# Patient Record
Sex: FEMALE | Race: WHITE | NOT HISPANIC OR LATINO | Employment: OTHER | URBAN - METROPOLITAN AREA
[De-identification: names, ages, dates, MRNs, and addresses within clinical notes are randomized per-mention and may not be internally consistent; named-entity substitution may affect disease eponyms.]

---

## 2023-03-22 ENCOUNTER — TELEPHONE (OUTPATIENT)
Dept: PRIMARY CARE | Facility: CLINIC | Age: 28
End: 2023-03-22
Payer: COMMERCIAL

## 2023-03-22 DIAGNOSIS — H10.30 ACUTE BACTERIAL CONJUNCTIVITIS, UNSPECIFIED LATERALITY: Primary | ICD-10-CM

## 2023-03-22 RX ORDER — CIPROFLOXACIN HYDROCHLORIDE 3 MG/ML
1 SOLUTION/ DROPS OPHTHALMIC
Qty: 6 ML | Refills: 0 | Status: SHIPPED | OUTPATIENT
Start: 2023-03-22 | End: 2023-04-01

## 2023-03-22 NOTE — TELEPHONE ENCOUNTER
Pt mom states pt was sent home from adult day care with pink eye.  Mom asking for medication and for note to return to care.

## 2023-03-29 LAB
ALANINE AMINOTRANSFERASE (SGPT) (U/L) IN SER/PLAS: 11 U/L (ref 7–45)
ALBUMIN (G/DL) IN SER/PLAS: 4.5 G/DL (ref 3.4–5)
ALKALINE PHOSPHATASE (U/L) IN SER/PLAS: 80 U/L (ref 33–110)
ANION GAP IN SER/PLAS: 17 MMOL/L (ref 10–20)
ASPARTATE AMINOTRANSFERASE (SGOT) (U/L) IN SER/PLAS: 15 U/L (ref 9–39)
BASOPHILS (10*3/UL) IN BLOOD BY AUTOMATED COUNT: 0.06 X10E9/L (ref 0–0.1)
BASOPHILS/100 LEUKOCYTES IN BLOOD BY AUTOMATED COUNT: 0.5 % (ref 0–2)
BILIRUBIN TOTAL (MG/DL) IN SER/PLAS: 0.3 MG/DL (ref 0–1.2)
C REACTIVE PROTEIN (MG/L) IN SER/PLAS: 0.4 MG/DL
CALCIUM (MG/DL) IN SER/PLAS: 9.4 MG/DL (ref 8.6–10.3)
CARBON DIOXIDE, TOTAL (MMOL/L) IN SER/PLAS: 21 MMOL/L (ref 21–32)
CHLORIDE (MMOL/L) IN SER/PLAS: 104 MMOL/L (ref 98–107)
CREATININE (MG/DL) IN SER/PLAS: 0.65 MG/DL (ref 0.5–1.05)
EOSINOPHILS (10*3/UL) IN BLOOD BY AUTOMATED COUNT: 0.36 X10E9/L (ref 0–0.7)
EOSINOPHILS/100 LEUKOCYTES IN BLOOD BY AUTOMATED COUNT: 2.9 % (ref 0–6)
ERYTHROCYTE DISTRIBUTION WIDTH (RATIO) BY AUTOMATED COUNT: 13.3 % (ref 11.5–14.5)
ERYTHROCYTE MEAN CORPUSCULAR HEMOGLOBIN CONCENTRATION (G/DL) BY AUTOMATED: 33 G/DL (ref 32–36)
ERYTHROCYTE MEAN CORPUSCULAR VOLUME (FL) BY AUTOMATED COUNT: 93 FL (ref 80–100)
ERYTHROCYTES (10*6/UL) IN BLOOD BY AUTOMATED COUNT: 4.99 X10E12/L (ref 4–5.2)
GAMMA GLUTAMYL TRANSFERASE (U/L) IN SER/PLAS: 11 U/L (ref 5–55)
GFR FEMALE: >90 ML/MIN/1.73M2
GLUCOSE (MG/DL) IN SER/PLAS: 86 MG/DL (ref 74–99)
HEMATOCRIT (%) IN BLOOD BY AUTOMATED COUNT: 46.3 % (ref 36–46)
HEMOGLOBIN (G/DL) IN BLOOD: 15.3 G/DL (ref 12–16)
IMMATURE GRANULOCYTES/100 LEUKOCYTES IN BLOOD BY AUTOMATED COUNT: 0.2 % (ref 0–0.9)
LEUKOCYTES (10*3/UL) IN BLOOD BY AUTOMATED COUNT: 12.6 X10E9/L (ref 4.4–11.3)
LYMPHOCYTES (10*3/UL) IN BLOOD BY AUTOMATED COUNT: 4.56 X10E9/L (ref 1.2–4.8)
LYMPHOCYTES/100 LEUKOCYTES IN BLOOD BY AUTOMATED COUNT: 36.1 % (ref 13–44)
MONOCYTES (10*3/UL) IN BLOOD BY AUTOMATED COUNT: 0.86 X10E9/L (ref 0.1–1)
MONOCYTES/100 LEUKOCYTES IN BLOOD BY AUTOMATED COUNT: 6.8 % (ref 2–10)
NEUTROPHILS (10*3/UL) IN BLOOD BY AUTOMATED COUNT: 6.76 X10E9/L (ref 1.2–7.7)
NEUTROPHILS/100 LEUKOCYTES IN BLOOD BY AUTOMATED COUNT: 53.5 % (ref 40–80)
NRBC (PER 100 WBCS) BY AUTOMATED COUNT: 0 /100 WBC (ref 0–0)
PLATELETS (10*3/UL) IN BLOOD AUTOMATED COUNT: 290 X10E9/L (ref 150–450)
POTASSIUM (MMOL/L) IN SER/PLAS: 4.1 MMOL/L (ref 3.5–5.3)
PROTEIN TOTAL: 7.9 G/DL (ref 6.4–8.2)
SEDIMENTATION RATE, ERYTHROCYTE: 30 MM/H (ref 0–20)
SODIUM (MMOL/L) IN SER/PLAS: 138 MMOL/L (ref 136–145)
UREA NITROGEN (MG/DL) IN SER/PLAS: 7 MG/DL (ref 6–23)

## 2023-03-30 LAB — CALCIDIOL (25 OH VITAMIN D3) (NG/ML) IN SER/PLAS: 31 NG/ML

## 2023-04-01 LAB
NIL(NEG) CONTROL SPOT COUNT: NORMAL
PANEL A SPOT COUNT: 0
PANEL B SPOT COUNT: 1
POS CONTROL SPOT COUNT: NORMAL
T-SPOT. TB INTERPRETATION: NEGATIVE

## 2023-04-11 LAB
AB TO ADALIMUMAB(ATA) CONC.: <1.7 U/ML
ADA RESULTS: ABNORMAL
ADALIMUMAB(ADA) CONC.: 14.2 UG/ML

## 2023-06-21 ENCOUNTER — OFFICE VISIT (OUTPATIENT)
Dept: PRIMARY CARE | Facility: CLINIC | Age: 28
End: 2023-06-21
Payer: COMMERCIAL

## 2023-06-21 VITALS — SYSTOLIC BLOOD PRESSURE: 122 MMHG | HEIGHT: 60 IN | BODY MASS INDEX: 31.31 KG/M2 | DIASTOLIC BLOOD PRESSURE: 74 MMHG

## 2023-06-21 DIAGNOSIS — K42.9 UMBILICAL HERNIA WITHOUT OBSTRUCTION AND WITHOUT GANGRENE: Primary | ICD-10-CM

## 2023-06-21 PROBLEM — R56.9 SEIZURES (MULTI): Status: ACTIVE | Noted: 2023-06-21

## 2023-06-21 PROBLEM — F70 MILD INTELLECTUAL DISABILITY: Status: ACTIVE | Noted: 2023-06-21

## 2023-06-21 PROBLEM — K59.00 CONSTIPATION: Status: ACTIVE | Noted: 2023-06-21

## 2023-06-21 PROBLEM — R32 URINARY INCONTINENCE: Status: ACTIVE | Noted: 2023-06-21

## 2023-06-21 PROBLEM — R23.8 SKIN BREAKDOWN: Status: ACTIVE | Noted: 2023-06-21

## 2023-06-21 PROBLEM — J06.9 UPPER RESPIRATORY TRACT INFECTION: Status: ACTIVE | Noted: 2023-06-21

## 2023-06-21 PROBLEM — E87.5 HYPERKALEMIA: Status: ACTIVE | Noted: 2023-06-21

## 2023-06-21 PROBLEM — G80.9 CEREBRAL PALSY (MULTI): Status: ACTIVE | Noted: 2023-06-21

## 2023-06-21 PROBLEM — Z79.620 ADALIMUMAB (HUMIRA) LONG-TERM USE: Status: ACTIVE | Noted: 2023-06-21

## 2023-06-21 PROBLEM — R29.4 HIP CLICK: Status: ACTIVE | Noted: 2023-06-21

## 2023-06-21 PROBLEM — R93.5 ABNORMAL CT OF THE ABDOMEN: Status: ACTIVE | Noted: 2023-06-21

## 2023-06-21 PROBLEM — K50.90 CROHN'S DISEASE (MULTI): Status: ACTIVE | Noted: 2020-07-29

## 2023-06-21 PROBLEM — K21.9 ESOPHAGEAL REFLUX: Status: ACTIVE | Noted: 2023-06-21

## 2023-06-21 PROBLEM — G80.0 CP (CEREBRAL PALSY), SPASTIC, QUADRIPLEGIC (MULTI): Status: ACTIVE | Noted: 2023-06-21

## 2023-06-21 PROBLEM — G24.9 GENERALIZED DYSTONIA: Status: ACTIVE | Noted: 2023-06-21

## 2023-06-21 PROBLEM — F43.22 ADJUSTMENT DISORDER WITH ANXIETY: Status: ACTIVE | Noted: 2018-02-14

## 2023-06-21 PROBLEM — M21.70 LEG LENGTH DISCREPANCY: Status: ACTIVE | Noted: 2023-06-21

## 2023-06-21 PROBLEM — D50.9 ANEMIA, IRON DEFICIENCY: Status: ACTIVE | Noted: 2020-12-23

## 2023-06-21 PROBLEM — H10.30 ACUTE CONJUNCTIVITIS: Status: ACTIVE | Noted: 2023-06-21

## 2023-06-21 PROBLEM — R21 RASH: Status: ACTIVE | Noted: 2023-06-21

## 2023-06-21 PROBLEM — G82.50 SPASTIC QUADRIPARESIS (MULTI): Status: ACTIVE | Noted: 2023-06-21

## 2023-06-21 PROBLEM — K56.699: Status: ACTIVE | Noted: 2023-06-21

## 2023-06-21 PROBLEM — M25.559 JOINT PAIN, HIP: Status: ACTIVE | Noted: 2023-06-21

## 2023-06-21 PROBLEM — D72.829 ELEVATED WHITE BLOOD CELL COUNT: Status: ACTIVE | Noted: 2023-06-21

## 2023-06-21 PROBLEM — R25.2 SPASTICITY: Status: ACTIVE | Noted: 2023-06-21

## 2023-06-21 PROBLEM — G40.909 SEIZURE DISORDER (MULTI): Status: ACTIVE | Noted: 2018-11-21

## 2023-06-21 PROCEDURE — 1036F TOBACCO NON-USER: CPT | Performed by: STUDENT IN AN ORGANIZED HEALTH CARE EDUCATION/TRAINING PROGRAM

## 2023-06-21 PROCEDURE — 99213 OFFICE O/P EST LOW 20 MIN: CPT | Performed by: STUDENT IN AN ORGANIZED HEALTH CARE EDUCATION/TRAINING PROGRAM

## 2023-06-21 RX ORDER — BENZOYL PEROXIDE 100 MG/ML
LIQUID TOPICAL
COMMUNITY

## 2023-06-21 RX ORDER — CLOBETASOL PROPIONATE 0.5 MG/G
CREAM TOPICAL
COMMUNITY
Start: 2022-04-26

## 2023-06-21 RX ORDER — ONDANSETRON 8 MG/1
TABLET, ORALLY DISINTEGRATING ORAL
COMMUNITY
End: 2023-06-21

## 2023-06-21 RX ORDER — OXYCODONE HYDROCHLORIDE 5 MG/1
TABLET ORAL
COMMUNITY
End: 2024-02-06 | Stop reason: ALTCHOICE

## 2023-06-21 RX ORDER — HYDROCORTISONE 25 MG/G
CREAM TOPICAL
COMMUNITY
Start: 2021-12-02

## 2023-06-21 RX ORDER — MINERAL OIL
ENEMA (ML) RECTAL
COMMUNITY
Start: 2022-02-09

## 2023-06-21 RX ORDER — CLOBETASOL PROPIONATE 0.46 MG/ML
SOLUTION TOPICAL
COMMUNITY
Start: 2021-10-07

## 2023-06-21 RX ORDER — LIDOCAINE 40 MG/G
CREAM TOPICAL
COMMUNITY
Start: 2019-12-11

## 2023-06-21 RX ORDER — PREDNISONE 20 MG/1
TABLET ORAL
COMMUNITY
End: 2024-02-06 | Stop reason: ALTCHOICE

## 2023-06-21 RX ORDER — DICYCLOMINE HYDROCHLORIDE 10 MG/5ML
SOLUTION ORAL
COMMUNITY
End: 2024-02-06 | Stop reason: ALTCHOICE

## 2023-06-21 RX ORDER — KETOCONAZOLE 20 MG/ML
SHAMPOO, SUSPENSION TOPICAL
COMMUNITY

## 2023-06-21 RX ORDER — LACTULOSE 10 G/15ML
SOLUTION ORAL; RECTAL
COMMUNITY
End: 2024-02-06 | Stop reason: ALTCHOICE

## 2023-06-21 RX ORDER — CLINDAMYCIN PHOSPHATE 11.9 MG/ML
SOLUTION TOPICAL
COMMUNITY
Start: 2022-06-14 | End: 2023-11-29 | Stop reason: WASHOUT

## 2023-06-21 RX ORDER — TERBINAFINE HYDROCHLORIDE 250 MG/1
1 TABLET ORAL DAILY
COMMUNITY
Start: 2021-12-27 | End: 2024-02-06 | Stop reason: ALTCHOICE

## 2023-06-21 RX ORDER — CLONAZEPAM 2 MG/1
TABLET, ORALLY DISINTEGRATING ORAL
COMMUNITY
End: 2024-02-06 | Stop reason: ALTCHOICE

## 2023-06-21 RX ORDER — ADALIMUMAB 40MG/0.4ML
KIT SUBCUTANEOUS
COMMUNITY
Start: 2019-12-10 | End: 2023-11-07 | Stop reason: SDUPTHER

## 2023-06-21 RX ORDER — CLINDAMYCIN PHOSPHATE 10 UG/ML
LOTION TOPICAL
COMMUNITY
End: 2023-11-29 | Stop reason: WASHOUT

## 2023-06-21 RX ORDER — ENOXAPARIN SODIUM 100 MG/ML
INJECTION SUBCUTANEOUS
COMMUNITY
End: 2024-02-06 | Stop reason: ALTCHOICE

## 2023-06-21 RX ORDER — MINOCYCLINE HYDROCHLORIDE 50 MG/1
TABLET ORAL
COMMUNITY
Start: 2023-06-11 | End: 2024-02-06 | Stop reason: ALTCHOICE

## 2023-06-21 RX ORDER — NEOMYCIN SULFATE 500 MG/1
TABLET ORAL
COMMUNITY
End: 2023-11-29 | Stop reason: WASHOUT

## 2023-06-21 RX ORDER — DOXYCYCLINE 50 MG/1
CAPSULE ORAL
COMMUNITY
Start: 2023-02-28 | End: 2024-02-06 | Stop reason: ALTCHOICE

## 2023-06-21 RX ORDER — ONDANSETRON 4 MG/1
TABLET, ORALLY DISINTEGRATING ORAL
COMMUNITY
Start: 2022-05-04 | End: 2024-02-06 | Stop reason: ALTCHOICE

## 2023-06-21 RX ORDER — OMEPRAZOLE 40 MG/1
CAPSULE, DELAYED RELEASE ORAL
COMMUNITY
End: 2024-02-06 | Stop reason: ALTCHOICE

## 2023-06-21 RX ORDER — KETOCONAZOLE 20 MG/G
CREAM TOPICAL
COMMUNITY

## 2023-06-21 RX ORDER — PIMECROLIMUS 10 MG/G
CREAM TOPICAL
COMMUNITY

## 2023-06-21 RX ORDER — CICLOPIROX OLAMINE 7.7 MG/G
CREAM TOPICAL
COMMUNITY
Start: 2021-12-27

## 2023-06-21 RX ORDER — TRIAMCINOLONE ACETONIDE 1 MG/G
CREAM TOPICAL
COMMUNITY

## 2023-06-21 ASSESSMENT — PAIN SCALES - GENERAL: PAINLEVEL: 0-NO PAIN

## 2023-06-21 NOTE — PATIENT INSTRUCTIONS
1.  Umbilicus hernia.  He is reducible today nontender.  Discussed sitting down with a general surgeon to discuss an elective repair to prevent a future problem.  Referral given today

## 2023-06-21 NOTE — PROGRESS NOTES
Subjective   Patient ID: Chloe Teran is a 27 y.o. female who presents for Hearing Problem (Hernia on belly buttom for 1 yr; growing concerns. ).    HPI comes in with her father and one of her caretakers.  Umbilical hernia they have noticed is slowly gotten bigger.  It does not cause her significant pain.    Review of Systems  Constitutional: NO F, chills, or sweats  Eyes: no blurred vision or visual disturbance  ENT: no hearing loss, no congestion, no nasal discharge, no hoarseness and no sore throat.   Cardiovascular: no chest pain, no edema, no palps and no syncope.   Respiratory: no cough,no s.o.b. and no wheezing  Gastrointestinal: Concern for umbilical hernia  Objective   /74 (BP Location: Left arm, Patient Position: Sitting, BP Cuff Size: Adult)   Ht 1.524 m (5')   BMI 31.31 kg/m²     Physical Exam  gen- a & o x 3, nad, pleasant    ab- soft, nontender, umbilical hernia is soft and reducible  Assessment/Plan     1.  Umbilicus hernia.  He is reducible today nontender.  Discussed sitting down with a general surgeon to discuss an elective repair to prevent a future problem.  Referral given today

## 2023-08-15 ENCOUNTER — TELEPHONE (OUTPATIENT)
Dept: PRIMARY CARE | Facility: CLINIC | Age: 28
End: 2023-08-15
Payer: COMMERCIAL

## 2023-08-15 NOTE — TELEPHONE ENCOUNTER
Randi Middleton L - [id number]     Born 11/18/1971   51 y.o. Female       [address]   [facility] [id number]              , mother states nobody else needs to be added to letter and thank you

## 2023-08-15 NOTE — TELEPHONE ENCOUNTER
Patient mother called asking if you can write a letter stating she is the legal guardian of the patient. They are requesting this for the court . Patient spouse will  once done   [facility] court system

## 2023-08-22 NOTE — TELEPHONE ENCOUNTER
Pt mom called again asking about letter.     I put together the letter and sent it back you pended so you can adjust/change whatever is needed.

## 2023-08-23 NOTE — PROGRESS NOTES
[facility],    Chloe Teran is a patient in our office.  Due to her chronic lifelong health issues she is completely disabled and dependent on others.    Her permanent guardian and power of  is     Randi Middleton 11/18/1971    [address]   [facility] [address]              Thank you for your cooperation.    Juan Ayala DO

## 2023-09-19 LAB
ALANINE AMINOTRANSFERASE (SGPT) (U/L) IN SER/PLAS: 10 U/L (ref 7–45)
ALBUMIN (G/DL) IN SER/PLAS: 4.8 G/DL (ref 3.4–5)
ALKALINE PHOSPHATASE (U/L) IN SER/PLAS: 72 U/L (ref 33–110)
ANION GAP IN SER/PLAS: 14 MMOL/L (ref 10–20)
ASPARTATE AMINOTRANSFERASE (SGOT) (U/L) IN SER/PLAS: 14 U/L (ref 9–39)
BASOPHILS (10*3/UL) IN BLOOD BY AUTOMATED COUNT: 0.05 X10E9/L (ref 0–0.1)
BASOPHILS/100 LEUKOCYTES IN BLOOD BY AUTOMATED COUNT: 0.4 % (ref 0–2)
BILIRUBIN TOTAL (MG/DL) IN SER/PLAS: 0.4 MG/DL (ref 0–1.2)
C REACTIVE PROTEIN (MG/L) IN SER/PLAS: 0.12 MG/DL
CALCIUM (MG/DL) IN SER/PLAS: 9.7 MG/DL (ref 8.6–10.3)
CARBON DIOXIDE, TOTAL (MMOL/L) IN SER/PLAS: 23 MMOL/L (ref 21–32)
CHLORIDE (MMOL/L) IN SER/PLAS: 105 MMOL/L (ref 98–107)
CREATININE (MG/DL) IN SER/PLAS: 0.58 MG/DL (ref 0.5–1.05)
EOSINOPHILS (10*3/UL) IN BLOOD BY AUTOMATED COUNT: 0.09 X10E9/L (ref 0–0.7)
EOSINOPHILS/100 LEUKOCYTES IN BLOOD BY AUTOMATED COUNT: 0.8 % (ref 0–6)
ERYTHROCYTE DISTRIBUTION WIDTH (RATIO) BY AUTOMATED COUNT: 12.8 % (ref 11.5–14.5)
ERYTHROCYTE MEAN CORPUSCULAR HEMOGLOBIN CONCENTRATION (G/DL) BY AUTOMATED: 31.5 G/DL (ref 32–36)
ERYTHROCYTE MEAN CORPUSCULAR VOLUME (FL) BY AUTOMATED COUNT: 96 FL (ref 80–100)
ERYTHROCYTES (10*6/UL) IN BLOOD BY AUTOMATED COUNT: 5.07 X10E12/L (ref 4–5.2)
GFR FEMALE: >90 ML/MIN/1.73M2
GLUCOSE (MG/DL) IN SER/PLAS: 99 MG/DL (ref 74–99)
HEMATOCRIT (%) IN BLOOD BY AUTOMATED COUNT: 48.9 % (ref 36–46)
HEMOGLOBIN (G/DL) IN BLOOD: 15.4 G/DL (ref 12–16)
IMMATURE GRANULOCYTES/100 LEUKOCYTES IN BLOOD BY AUTOMATED COUNT: 0.3 % (ref 0–0.9)
LEUKOCYTES (10*3/UL) IN BLOOD BY AUTOMATED COUNT: 11.7 X10E9/L (ref 4.4–11.3)
LYMPHOCYTES (10*3/UL) IN BLOOD BY AUTOMATED COUNT: 3.24 X10E9/L (ref 1.2–4.8)
LYMPHOCYTES/100 LEUKOCYTES IN BLOOD BY AUTOMATED COUNT: 27.6 % (ref 13–44)
MONOCYTES (10*3/UL) IN BLOOD BY AUTOMATED COUNT: 0.53 X10E9/L (ref 0.1–1)
MONOCYTES/100 LEUKOCYTES IN BLOOD BY AUTOMATED COUNT: 4.5 % (ref 2–10)
NEUTROPHILS (10*3/UL) IN BLOOD BY AUTOMATED COUNT: 7.79 X10E9/L (ref 1.2–7.7)
NEUTROPHILS/100 LEUKOCYTES IN BLOOD BY AUTOMATED COUNT: 66.4 % (ref 40–80)
NRBC (PER 100 WBCS) BY AUTOMATED COUNT: 0 /100 WBC (ref 0–0)
PLATELETS (10*3/UL) IN BLOOD AUTOMATED COUNT: 246 X10E9/L (ref 150–450)
POTASSIUM (MMOL/L) IN SER/PLAS: 3.7 MMOL/L (ref 3.5–5.3)
PROTEIN TOTAL: 8.3 G/DL (ref 6.4–8.2)
SEDIMENTATION RATE, ERYTHROCYTE: 15 MM/H (ref 0–20)
SODIUM (MMOL/L) IN SER/PLAS: 138 MMOL/L (ref 136–145)
UREA NITROGEN (MG/DL) IN SER/PLAS: 6 MG/DL (ref 6–23)

## 2023-09-27 LAB — CALPROTECTIN, STOOL: 80 UG/G

## 2023-10-06 ENCOUNTER — TELEPHONE (OUTPATIENT)
Dept: PRIMARY CARE | Facility: CLINIC | Age: 28
End: 2023-10-06
Payer: COMMERCIAL

## 2023-10-06 DIAGNOSIS — H10.30 ACUTE CONJUNCTIVITIS, UNSPECIFIED ACUTE CONJUNCTIVITIS TYPE, UNSPECIFIED LATERALITY: Primary | ICD-10-CM

## 2023-10-06 RX ORDER — POLYMYXIN B SULFATE AND TRIMETHOPRIM 1; 10000 MG/ML; [USP'U]/ML
1 SOLUTION OPHTHALMIC
Qty: 10 ML | Refills: 0 | Status: SHIPPED | OUTPATIENT
Start: 2023-10-06 | End: 2023-10-13

## 2023-10-06 NOTE — TELEPHONE ENCOUNTER
Pt mother called and states that the pt has pink eye and states that you prescribed her eye drops once before and would like to know if you could prescribe her the eye drops again. Pharmacy is on file.

## 2023-10-17 ENCOUNTER — TELEPHONE (OUTPATIENT)
Dept: GASTROENTEROLOGY | Facility: CLINIC | Age: 28
End: 2023-10-17
Payer: COMMERCIAL

## 2023-11-07 DIAGNOSIS — K50.919 CROHN'S DISEASE WITH COMPLICATION, UNSPECIFIED GASTROINTESTINAL TRACT LOCATION (MULTI): Primary | ICD-10-CM

## 2023-11-07 RX ORDER — ADALIMUMAB 40MG/0.4ML
40 KIT SUBCUTANEOUS
Qty: 2 EACH | Refills: 11 | Status: SHIPPED | OUTPATIENT
Start: 2023-11-07 | End: 2023-11-29 | Stop reason: SDUPTHER

## 2023-11-28 ENCOUNTER — TELEPHONE (OUTPATIENT)
Dept: GASTROENTEROLOGY | Facility: CLINIC | Age: 28
End: 2023-11-28
Payer: COMMERCIAL

## 2023-11-28 NOTE — TELEPHONE ENCOUNTER
Pt.'s daughter called and the only way that her Humira CF Pen 40 mg will be paid for from insurance if it is filled at [facility] pharmacy.  I added [facility] to the pharmacy list.  Please in script at [facility].

## 2023-11-29 DIAGNOSIS — K50.919 CROHN'S DISEASE WITH COMPLICATION, UNSPECIFIED GASTROINTESTINAL TRACT LOCATION (MULTI): ICD-10-CM

## 2023-11-29 RX ORDER — ADALIMUMAB 40MG/0.4ML
40 KIT SUBCUTANEOUS
Qty: 2 EACH | Refills: 11 | Status: SHIPPED | OUTPATIENT
Start: 2023-11-29 | End: 2024-11-28

## 2023-12-01 ENCOUNTER — TELEPHONE (OUTPATIENT)
Dept: PRIMARY CARE | Facility: CLINIC | Age: 28
End: 2023-12-01
Payer: COMMERCIAL

## 2023-12-01 NOTE — TELEPHONE ENCOUNTER
Amari, the patient's grandfather called stating he dropped off some paperwork for braces a couple of weeks ago. Wants to know the status. I did not find it in your box in [place] where he originally dropped it off.

## 2023-12-04 DIAGNOSIS — G82.50 SPASTIC QUADRIPARESIS (MULTI): ICD-10-CM

## 2023-12-04 DIAGNOSIS — G80.0 CP (CEREBRAL PALSY), SPASTIC, QUADRIPLEGIC (MULTI): Primary | ICD-10-CM

## 2023-12-04 DIAGNOSIS — M21.371 FOOT DROP, BILATERAL: ICD-10-CM

## 2023-12-04 DIAGNOSIS — M21.372 FOOT DROP, BILATERAL: ICD-10-CM

## 2023-12-20 ENCOUNTER — TELEPHONE (OUTPATIENT)
Dept: PRIMARY CARE | Facility: CLINIC | Age: 28
End: 2023-12-20
Payer: COMMERCIAL

## 2023-12-20 NOTE — TELEPHONE ENCOUNTER
Chloe is in a program and they thought she had pink eye but Mirian states she doesn't have pink eye, she just had a cut/scratch on her eye.   In order to return they need a note stating she does not have pink eye and can return today mirian stated they will be going to the [place] office today

## 2024-01-15 ENCOUNTER — APPOINTMENT (OUTPATIENT)
Dept: PRIMARY CARE | Facility: CLINIC | Age: 29
End: 2024-01-15
Payer: COMMERCIAL

## 2024-02-06 ENCOUNTER — OFFICE VISIT (OUTPATIENT)
Dept: PRIMARY CARE | Facility: CLINIC | Age: 29
End: 2024-02-06
Payer: COMMERCIAL

## 2024-02-06 VITALS — DIASTOLIC BLOOD PRESSURE: 78 MMHG | SYSTOLIC BLOOD PRESSURE: 118 MMHG

## 2024-02-06 DIAGNOSIS — G80.0 CP (CEREBRAL PALSY), SPASTIC, QUADRIPLEGIC (MULTI): Primary | ICD-10-CM

## 2024-02-06 DIAGNOSIS — M21.70 LEG LENGTH DISCREPANCY: ICD-10-CM

## 2024-02-06 DIAGNOSIS — G82.50 SPASTIC QUADRIPLEGIA (MULTI): ICD-10-CM

## 2024-02-06 PROBLEM — S09.90XA CLOSED HEAD INJURY: Status: ACTIVE | Noted: 2024-02-06
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
## 2024-02-06 PROBLEM — L90.9 ATROPHY OF SKIN: Status: ACTIVE | Noted: 2024-02-06

## 2024-02-06 PROBLEM — K52.9 ILEITIS: Status: ACTIVE | Noted: 2024-02-06

## 2024-02-06 PROBLEM — B35.6 TINEA CRURIS: Status: ACTIVE | Noted: 2021-12-27

## 2024-02-06 PROBLEM — L21.8 OTHER SEBORRHEIC DERMATITIS: Status: ACTIVE | Noted: 2021-09-21

## 2024-02-06 PROBLEM — K42.9 UMBILICAL HERNIA: Status: ACTIVE | Noted: 2024-02-06

## 2024-02-06 PROBLEM — L30.4 ERYTHEMA INTERTRIGO: Status: ACTIVE | Noted: 2021-09-21

## 2024-02-06 PROBLEM — L73.8 OTHER SPECIFIED FOLLICULAR DISORDERS: Status: ACTIVE | Noted: 2021-07-14

## 2024-02-06 PROBLEM — L40.0 PSORIASIS VULGARIS: Status: ACTIVE | Noted: 2021-07-14

## 2024-02-06 PROBLEM — R70.0 ELEVATED ERYTHROCYTE SEDIMENTATION RATE: Status: ACTIVE | Noted: 2024-02-06

## 2024-02-06 PROBLEM — R79.82 HIGH C-REACTIVE PROTEIN: Status: ACTIVE | Noted: 2024-02-06

## 2024-02-06 PROBLEM — R11.2 NAUSEA AND VOMITING: Status: ACTIVE | Noted: 2024-02-06

## 2024-02-06 PROBLEM — R21 RASH AND OTHER NONSPECIFIC SKIN ERUPTION: Status: ACTIVE | Noted: 2021-10-07

## 2024-02-06 PROCEDURE — 1036F TOBACCO NON-USER: CPT | Performed by: STUDENT IN AN ORGANIZED HEALTH CARE EDUCATION/TRAINING PROGRAM

## 2024-02-06 PROCEDURE — 99213 OFFICE O/P EST LOW 20 MIN: CPT | Performed by: STUDENT IN AN ORGANIZED HEALTH CARE EDUCATION/TRAINING PROGRAM

## 2024-02-06 RX ORDER — CLINDAMYCIN PHOSPHATE 11.9 MG/ML
SOLUTION TOPICAL
COMMUNITY

## 2024-02-06 ASSESSMENT — ENCOUNTER SYMPTOMS: DEPRESSION: 0

## 2024-02-06 NOTE — PROGRESS NOTES
Subjective   Patient ID: Chloe Teran is a 28 y.o. female who presents for Follow-up (Needs new leg braces.).    HPI comes in with her father.  Needs orders for new AFO braces bilaterally and left-sided shoe lift.    Review of Systems  Constitutional: NO F, chills, or sweats  Eyes: no blurred vision or visual disturbance  ENT: no hearing loss, no congestion, no nasal discharge, no hoarseness and no sore throat.   Cardiovascular: no chest pain, no edema, no palps and no syncope.   Respiratory: no cough,no s.o.b. and no wheezing  Gastrointestinal: no abdominal pain, No C/D no N/V, no blood in stools  Genitourinary: no dysuria, no change in urinary frequency, no urinary hesitancy and no feelings of urinary urgency.   Musculoskeletal: no arthralgias,  no back pain and no myalgias.   Integumentary: no new skin lesions and no rashes.   Neurological: Spastic quadriplegia    Objective   /78 (BP Location: Left arm, Patient Position: Sitting, BP Cuff Size: Large adult)     Physical Exam  gen- a & o x 3, nad, pleasant  heent- eomi, perrla, ear canals patent, TM's non-erythematous, no fluid, frontal and maxillary sinus's nontender  neck- supple, nontender, no palpable or enlarged nodes, no thyromegaly  heart- rrr,   lungs- cta b/l , no w/r/r  chest- symmetric, nontender  ab- soft, nontender, no palpable organomegaly, postive bowel sounds  ex's- no c/c/e  neuro- CNs 2-12 grossly intact, bilateral upper and lower extremity spasticity, quadriplegia  Assessment/Plan     #1.  History of cerebral palsy, spastic quadriplegia.  Today comes in for removal of bilateral AFO braces.  Order placed today.  Also needs a left lower extremity shoe lift due to leg length discrepancy.  Order placed today.

## 2024-02-29 ENCOUNTER — TELEPHONE (OUTPATIENT)
Dept: GASTROENTEROLOGY | Facility: CLINIC | Age: 29
End: 2024-02-29
Payer: COMMERCIAL

## 2024-02-29 NOTE — TELEPHONE ENCOUNTER
I called the number listed, it was her mom, and she gave me I think grandpas number, so I called that and had to leave a message, stating provider no longer works on Tuesdays, and that the apt for 4/2 was going to be canceled, and that we could see her next wed or Thursday. To call our office

## 2024-03-06 ENCOUNTER — APPOINTMENT (OUTPATIENT)
Dept: GASTROENTEROLOGY | Facility: CLINIC | Age: 29
End: 2024-03-06
Payer: COMMERCIAL

## 2024-04-02 ENCOUNTER — APPOINTMENT (OUTPATIENT)
Dept: GASTROENTEROLOGY | Facility: CLINIC | Age: 29
End: 2024-04-02
Payer: COMMERCIAL

## 2024-10-03 ENCOUNTER — APPOINTMENT (OUTPATIENT)
Dept: PRIMARY CARE | Facility: CLINIC | Age: 29
End: 2024-10-03
Payer: COMMERCIAL

## 2024-10-08 ENCOUNTER — APPOINTMENT (OUTPATIENT)
Dept: PRIMARY CARE | Facility: CLINIC | Age: 29
End: 2024-10-08
Payer: COMMERCIAL

## 2024-10-10 ENCOUNTER — APPOINTMENT (OUTPATIENT)
Dept: PRIMARY CARE | Facility: CLINIC | Age: 29
End: 2024-10-10
Payer: COMMERCIAL

## 2024-10-10 VITALS
WEIGHT: 130 LBS | BODY MASS INDEX: 25.39 KG/M2 | HEART RATE: 86 BPM | OXYGEN SATURATION: 96 % | SYSTOLIC BLOOD PRESSURE: 132 MMHG | TEMPERATURE: 97.7 F | DIASTOLIC BLOOD PRESSURE: 84 MMHG

## 2024-10-10 DIAGNOSIS — H10.33 ACUTE BACTERIAL CONJUNCTIVITIS OF BOTH EYES: ICD-10-CM

## 2024-10-10 DIAGNOSIS — Z00.00 ENCOUNTER FOR ANNUAL PHYSICAL EXAM: Primary | ICD-10-CM

## 2024-10-10 PROCEDURE — 99395 PREV VISIT EST AGE 18-39: CPT | Performed by: NURSE PRACTITIONER

## 2024-10-10 PROCEDURE — 1036F TOBACCO NON-USER: CPT | Performed by: NURSE PRACTITIONER

## 2024-10-10 RX ORDER — TOBRAMYCIN AND DEXAMETHASONE 3; 1 MG/ML; MG/ML
1 SUSPENSION/ DROPS OPHTHALMIC
Qty: 10 ML | Refills: 0 | Status: SHIPPED | OUTPATIENT
Start: 2024-10-10 | End: 2024-10-20

## 2024-10-10 ASSESSMENT — ENCOUNTER SYMPTOMS
CONFUSION: 0
RESPIRATORY NEGATIVE: 1
EYES NEGATIVE: 1
FACIAL ASYMMETRY: 0
WEAKNESS: 0
DIZZINESS: 0
POLYPHAGIA: 0
ENDOCRINE NEGATIVE: 1
NECK STIFFNESS: 1
SLEEP DISTURBANCE: 0
WOUND: 0
NERVOUS/ANXIOUS: 0
NEUROLOGICAL NEGATIVE: 1
GASTROINTESTINAL NEGATIVE: 1
ALLERGIC/IMMUNOLOGIC NEGATIVE: 1
CONSTITUTIONAL NEGATIVE: 1
CARDIOVASCULAR NEGATIVE: 1
LIGHT-HEADEDNESS: 0
HEMATOLOGIC/LYMPHATIC NEGATIVE: 1

## 2024-10-10 ASSESSMENT — PATIENT HEALTH QUESTIONNAIRE - PHQ9
SUM OF ALL RESPONSES TO PHQ9 QUESTIONS 1 AND 2: 0
2. FEELING DOWN, DEPRESSED OR HOPELESS: NOT AT ALL
1. LITTLE INTEREST OR PLEASURE IN DOING THINGS: NOT AT ALL

## 2024-10-10 NOTE — PROGRESS NOTES
Subjective   Patient ID: Chloe Teran is a 28 y.o. female who presents for Establish Care.    HPI Chloe is a 28-year-old female here to establish accompanied by grandfather and caregiver.    History of cerebral palsy, spastic quadriplegia.Crohn disease   History of stricturing small bowel Crohn's disease status post ileocecectomy in 2020. She has been maintained on Humira monotherapy with the last level at 14 adequate.   Follows with the GI team.  Concerns for possible pinkeye?  Does noted with drainage crusting of eyelid that has been noticed for the past several days by her caregiver that is present.  Chloe herself has no concerns she is eating and drinking okay moving bowels and bladder okay.    Active Problems  Problems    · Abnormal CT of the abdomen (793.6) (R93.5)   · Acute conjunctivitis (372.00) (H10.30)   · Adalimumab (Humira) long-term use (V58.69) (Z79.620)   · Anemia, iron deficiency (280.9) (D50.9)   · Constipation (564.00) (K59.00)   · CP (cerebral palsy), spastic, quadriplegic (343.2) (G80.0)   · Crohn disease (555.9) (K50.90)   · Elevated erythrocyte sedimentation rate (790.1) (R70.0)   · Elevated white blood cell count (288.60) (D72.829)   · Esophageal reflux (530.81) (K21.9)   · Hip click (719.65) (R29.4)   · Hyperkalemia (276.7) (E87.5)   · Joint pain, hip (719.45) (M25.559)   · Added by Problem List Migration; 2013-7-21; Moved to [facility] Nov 25 2013 9:34PM   · Leg length discrepancy (736.81) (M21.70)   · Rash (782.1) (R21)   · Seizures (780.39) (R56.9)   · Skin breakdown (782.9) (R23.8)   · Spastic quadriparesis (344.00) (G82.50)   · Spasticity (781.0) (R25.2)   · Stricture intestinal (560.9) (K56.699)   · Upper respiratory tract infection, unspecified type (465.9) (J06.9)     Past Medical History  Problems    · CP (cerebral palsy), spastic, quadriplegic (343.2) (G80.0)   · History of No significant past medical history   · Seizures (780.39) (R56.9)     Surgical History  Problems    ·  History of Esophagogastroduodenoscopy   · History of Eye surgery   · History of Gastroscopy   · History of Hip Surgery   · History of Leg surgery     Family History  Mother    · Family history of autoimmune disorder (V19.8) (Z83.2)     Social History  Problems    · Never a smoker   · Never smoker   · Occasional alcohol use     Review of Systems   Constitutional: Negative.    HENT: Negative.     Eyes: Negative.    Respiratory: Negative.     Cardiovascular: Negative.    Gastrointestinal: Negative.    Endocrine: Negative.  Negative for polyphagia.   Genitourinary: Negative.    Musculoskeletal:  Positive for neck stiffness.   Skin: Negative.  Negative for pallor, rash and wound.   Allergic/Immunologic: Negative.    Neurological: Negative.  Negative for dizziness, facial asymmetry, weakness and light-headedness.   Hematological: Negative.    Psychiatric/Behavioral:  Negative for confusion, sleep disturbance and suicidal ideas. The patient is not nervous/anxious.    All other systems reviewed and are negative.      Objective   /84   Pulse 86   Temp 36.5 °C (97.7 °F)   Wt 59 kg (130 lb)   SpO2 96%   BMI 25.39 kg/m²     Physical Exam    Assessment/Plan   1. Encounter for annual physical exam  - Hemoglobin A1C; Future  - CBC; Future  - Comprehensive Metabolic Panel; Future  - Lipid Panel; Future  - TSH with reflex to Free T4 if abnormal; Future  - Triiodothyronine, Total; Future    2. Acute bacterial conjunctivitis of both eyes  - tobramycin-dexamethasone (Tobradex) ophthalmic suspension; Administer 1 drop into both eyes every 4 hours while awake for 10 days.  Dispense: 10 mL; Refill: 0  Caregiver has been instructed to apply warm compresses twice a day.      Fu in 6 months or sooner if problems

## 2024-12-13 ENCOUNTER — TELEPHONE (OUTPATIENT)
Dept: PRIMARY CARE | Facility: CLINIC | Age: 29
End: 2024-12-13
Payer: COMMERCIAL

## 2024-12-13 NOTE — TELEPHONE ENCOUNTER
[facility] FOR CMN FORM   SAID IT WAS SENT 12/2  CALL BACK NUMBER [phone]

## 2025-01-29 ENCOUNTER — SPECIALTY PHARMACY (OUTPATIENT)
Dept: PHARMACY | Facility: CLINIC | Age: 30
End: 2025-01-29

## 2025-02-13 ENCOUNTER — SPECIALTY PHARMACY (OUTPATIENT)
Dept: PHARMACY | Facility: CLINIC | Age: 30
End: 2025-02-13

## 2025-02-25 ENCOUNTER — SPECIALTY PHARMACY (OUTPATIENT)
Dept: PHARMACY | Facility: CLINIC | Age: 30
End: 2025-02-25

## 2025-02-25 PROCEDURE — RXMED WILLOW AMBULATORY MEDICATION CHARGE

## 2025-02-26 ENCOUNTER — PHARMACY VISIT (OUTPATIENT)
Dept: PHARMACY | Facility: CLINIC | Age: 30
End: 2025-02-26
Payer: COMMERCIAL

## 2025-03-03 ENCOUNTER — SPECIALTY PHARMACY (OUTPATIENT)
Dept: PHARMACY | Facility: CLINIC | Age: 30
End: 2025-03-03

## 2025-03-04 ENCOUNTER — SPECIALTY PHARMACY (OUTPATIENT)
Dept: PHARMACY | Facility: CLINIC | Age: 30
End: 2025-03-04

## 2025-03-04 NOTE — PROGRESS NOTES
Patient's mother Randi declined pharmacist offer to  as they feel comfortable with their daughter's medication - mother is a nurse and patient has been on adalimumab prior. Randi was advised of [facility]'s dispensing process, refill timeline, contact information, and patient management follow up. Encouraged patient to reach out with any questions or concerns.

## 2025-03-14 ENCOUNTER — SPECIALTY PHARMACY (OUTPATIENT)
Dept: PHARMACY | Facility: CLINIC | Age: 30
End: 2025-03-14

## 2025-04-01 ENCOUNTER — SPECIALTY PHARMACY (OUTPATIENT)
Dept: PHARMACY | Facility: CLINIC | Age: 30
End: 2025-04-01

## 2025-04-28 ENCOUNTER — SPECIALTY PHARMACY (OUTPATIENT)
Dept: PHARMACY | Facility: CLINIC | Age: 30
End: 2025-04-28

## 2025-05-09 ENCOUNTER — APPOINTMENT (OUTPATIENT)
Dept: PRIMARY CARE | Facility: CLINIC | Age: 30
End: 2025-05-09
Payer: COMMERCIAL

## 2025-06-16 ENCOUNTER — APPOINTMENT (OUTPATIENT)
Dept: PRIMARY CARE | Facility: CLINIC | Age: 30
End: 2025-06-16
Payer: COMMERCIAL